# Patient Record
Sex: MALE | Race: BLACK OR AFRICAN AMERICAN | NOT HISPANIC OR LATINO | Employment: FULL TIME | URBAN - METROPOLITAN AREA
[De-identification: names, ages, dates, MRNs, and addresses within clinical notes are randomized per-mention and may not be internally consistent; named-entity substitution may affect disease eponyms.]

---

## 2021-04-22 ENCOUNTER — IMMUNIZATION (OUTPATIENT)
Dept: PRIMARY CARE CLINIC | Facility: CLINIC | Age: 41
End: 2021-04-22

## 2021-04-22 DIAGNOSIS — Z23 NEED FOR VACCINATION: Primary | ICD-10-CM

## 2021-04-22 PROCEDURE — 91300 COVID-19, MRNA, LNP-S, PF, 30 MCG/0.3 ML DOSE VACCINE: CPT | Mod: S$GLB,,, | Performed by: FAMILY MEDICINE

## 2021-04-22 PROCEDURE — 91300 COVID-19, MRNA, LNP-S, PF, 30 MCG/0.3 ML DOSE VACCINE: ICD-10-PCS | Mod: S$GLB,,, | Performed by: FAMILY MEDICINE

## 2021-04-22 PROCEDURE — 0001A COVID-19, MRNA, LNP-S, PF, 30 MCG/0.3 ML DOSE VACCINE: ICD-10-PCS | Mod: CV19,S$GLB,, | Performed by: FAMILY MEDICINE

## 2021-04-22 PROCEDURE — 0001A COVID-19, MRNA, LNP-S, PF, 30 MCG/0.3 ML DOSE VACCINE: CPT | Mod: CV19,S$GLB,, | Performed by: FAMILY MEDICINE

## 2021-05-13 ENCOUNTER — IMMUNIZATION (OUTPATIENT)
Dept: PRIMARY CARE CLINIC | Facility: CLINIC | Age: 41
End: 2021-05-13

## 2021-05-13 DIAGNOSIS — Z23 NEED FOR VACCINATION: Primary | ICD-10-CM

## 2021-05-13 PROCEDURE — 91300 COVID-19, MRNA, LNP-S, PF, 30 MCG/0.3 ML DOSE VACCINE: CPT | Mod: S$GLB,,, | Performed by: FAMILY MEDICINE

## 2021-05-13 PROCEDURE — 91300 COVID-19, MRNA, LNP-S, PF, 30 MCG/0.3 ML DOSE VACCINE: ICD-10-PCS | Mod: S$GLB,,, | Performed by: FAMILY MEDICINE

## 2021-05-13 PROCEDURE — 0002A COVID-19, MRNA, LNP-S, PF, 30 MCG/0.3 ML DOSE VACCINE: CPT | Mod: CV19,S$GLB,, | Performed by: FAMILY MEDICINE

## 2021-05-13 PROCEDURE — 0002A COVID-19, MRNA, LNP-S, PF, 30 MCG/0.3 ML DOSE VACCINE: ICD-10-PCS | Mod: CV19,S$GLB,, | Performed by: FAMILY MEDICINE

## 2021-09-19 ENCOUNTER — OFFICE VISIT (OUTPATIENT)
Dept: URGENT CARE | Facility: CLINIC | Age: 41
End: 2021-09-19
Payer: OTHER GOVERNMENT

## 2021-09-19 VITALS
SYSTOLIC BLOOD PRESSURE: 150 MMHG | HEART RATE: 76 BPM | DIASTOLIC BLOOD PRESSURE: 102 MMHG | OXYGEN SATURATION: 98 % | HEIGHT: 72 IN | WEIGHT: 191 LBS | BODY MASS INDEX: 25.87 KG/M2 | TEMPERATURE: 99 F

## 2021-09-19 DIAGNOSIS — H16.131: Primary | ICD-10-CM

## 2021-09-19 DIAGNOSIS — H16.001 ULCER OF RIGHT CORNEA: ICD-10-CM

## 2021-09-19 PROCEDURE — 99214 OFFICE O/P EST MOD 30 MIN: CPT | Mod: TIER,S$GLB,, | Performed by: NURSE PRACTITIONER

## 2021-09-19 PROCEDURE — 99214 PR OFFICE/OUTPT VISIT, EST, LEVL IV, 30-39 MIN: ICD-10-PCS | Mod: TIER,S$GLB,, | Performed by: NURSE PRACTITIONER

## 2021-09-19 RX ORDER — ERYTHROMYCIN 5 MG/G
OINTMENT OPHTHALMIC 3 TIMES DAILY
Qty: 1 G | Refills: 0 | Status: SHIPPED | OUTPATIENT
Start: 2021-09-19 | End: 2021-09-29

## 2021-09-19 RX ORDER — BALANCED SALT SOLN NON-SURG 6
20 SOLUTION, IRRIGATION OPHTHALMIC (EYE)
Qty: 473 ML | Refills: 1 | Status: SHIPPED | OUTPATIENT
Start: 2021-09-19 | End: 2021-09-22

## 2022-03-10 ENCOUNTER — CLINICAL SUPPORT (OUTPATIENT)
Dept: URGENT CARE | Facility: CLINIC | Age: 42
End: 2022-03-10

## 2022-03-10 DIAGNOSIS — Z02.83 ENCOUNTER FOR EMPLOYMENT-RELATED DRUG TESTING: ICD-10-CM

## 2022-03-10 PROCEDURE — 82075 CHG ASSAY OF BREATH ETHANOL: ICD-10-PCS | Mod: S$GLB,,, | Performed by: EMERGENCY MEDICINE

## 2022-03-10 PROCEDURE — 82075 ASSAY OF BREATH ETHANOL: CPT | Mod: S$GLB,,, | Performed by: EMERGENCY MEDICINE

## 2024-04-23 ENCOUNTER — HOSPITAL ENCOUNTER (EMERGENCY)
Facility: HOSPITAL | Age: 44
Discharge: HOME | End: 2024-04-23
Attending: EMERGENCY MEDICINE
Payer: COMMERCIAL

## 2024-04-23 VITALS
TEMPERATURE: 99 F | SYSTOLIC BLOOD PRESSURE: 161 MMHG | RESPIRATION RATE: 18 BRPM | WEIGHT: 190 LBS | OXYGEN SATURATION: 99 % | DIASTOLIC BLOOD PRESSURE: 89 MMHG | HEART RATE: 67 BPM | HEIGHT: 72 IN | BODY MASS INDEX: 25.73 KG/M2

## 2024-04-23 VITALS
OXYGEN SATURATION: 99 % | SYSTOLIC BLOOD PRESSURE: 152 MMHG | WEIGHT: 190 LBS | DIASTOLIC BLOOD PRESSURE: 103 MMHG | RESPIRATION RATE: 16 BRPM | BODY MASS INDEX: 25.73 KG/M2 | TEMPERATURE: 98 F | HEART RATE: 52 BPM | HEIGHT: 72 IN

## 2024-04-23 DIAGNOSIS — L30.1 DYSHIDROTIC ECZEMA: Primary | ICD-10-CM

## 2024-04-23 DIAGNOSIS — L29.9 PRURITUS: Primary | ICD-10-CM

## 2024-04-23 DIAGNOSIS — L30.9 DERMATITIS: ICD-10-CM

## 2024-04-23 PROCEDURE — 99283 EMERGENCY DEPT VISIT LOW MDM: CPT

## 2024-04-23 PROCEDURE — 63600175 PHARM REV CODE 636 W HCPCS: Performed by: EMERGENCY MEDICINE

## 2024-04-23 PROCEDURE — 25000003 PHARM REV CODE 250: Performed by: EMERGENCY MEDICINE

## 2024-04-23 RX ORDER — PREDNISONE 20 MG/1
40 TABLET ORAL DAILY
Qty: 14 TABLET | Refills: 0 | Status: SHIPPED | OUTPATIENT
Start: 2024-04-23 | End: 2024-04-30

## 2024-04-23 RX ORDER — PERMETHRIN 50 MG/G
CREAM TOPICAL ONCE
Qty: 60 G | Refills: 0 | Status: SHIPPED | OUTPATIENT
Start: 2024-04-23 | End: 2024-04-23

## 2024-04-23 RX ORDER — HYDROXYZINE PAMOATE 25 MG/1
25 CAPSULE ORAL
Status: COMPLETED | OUTPATIENT
Start: 2024-04-23 | End: 2024-04-23

## 2024-04-23 RX ORDER — HYDROXYZINE PAMOATE 25 MG/1
25 CAPSULE ORAL EVERY 6 HOURS PRN
Qty: 28 CAPSULE | Refills: 0 | Status: SHIPPED | OUTPATIENT
Start: 2024-04-23 | End: 2024-04-30

## 2024-04-23 RX ORDER — BETAMETHASONE VALERATE 1 MG/G
CREAM TOPICAL 2 TIMES DAILY
Qty: 45 G | Refills: 0 | Status: SHIPPED | OUTPATIENT
Start: 2024-04-23

## 2024-04-23 RX ORDER — PREDNISONE 20 MG/1
60 TABLET ORAL
Status: COMPLETED | OUTPATIENT
Start: 2024-04-23 | End: 2024-04-23

## 2024-04-23 RX ADMIN — HYDROXYZINE PAMOATE 25 MG: 25 CAPSULE ORAL at 03:04

## 2024-04-23 RX ADMIN — PREDNISONE 60 MG: 20 TABLET ORAL at 10:04

## 2024-04-23 NOTE — Clinical Note
"Bill Harrell" Wayne was seen and treated in our emergency department on 4/23/2024.  He may return to work on 04/29/2024.       If you have any questions or concerns, please don't hesitate to call.      Domingo Pena MD"

## 2024-04-23 NOTE — DISCHARGE INSTRUCTIONS
As you do have rash on the hands we are treating for scabies but this likely is a viral exanthem.  Use as directed and follow-up with dermatologist in 1 week if no relief

## 2024-04-23 NOTE — FIRST PROVIDER EVALUATION
Emergency Department TeleTriage Encounter Note      CHIEF COMPLAINT    Chief Complaint   Patient presents with    Rash     Rash(blisters) to hands and going up arms. Pt was seen here early this morning for same thing and he states it is getting worse.        VITAL SIGNS   Initial Vitals [04/23/24 1641]   BP Pulse Resp Temp SpO2   (!) 161/89 67 18 99 °F (37.2 °C) 99 %      MAP       --            ALLERGIES    Review of patient's allergies indicates:  No Known Allergies    PROVIDER TRIAGE NOTE  Patient presents with complaint of rash to hands.      Phy:   Constitutional: well nourished, well developed, appearing stated age, NAD        Initial orders will be placed and care will be transferred to an alternate provider when patient is roomed for a full evaluation. Any additional orders and the final disposition will be determined by that provider.        ORDERS  Labs Reviewed - No data to display    ED Orders (720h ago, onward)      None              Virtual Visit Note: The provider triage portion of this emergency department evaluation and documentation was performed via [employer], a HIPAA-compliant telemedicine application, in concert with a tele-presenter in the room. A face to face patient evaluation with one of my colleagues will occur once the patient is placed in an emergency department room.      DISCLAIMER: This note was prepared with [employer] voice recognition transcription software. Garbled syntax, mangled pronouns, and other bizarre constructions may be attributed to that software system.

## 2024-04-23 NOTE — ED PROVIDER NOTES
Encounter Date: 4/23/2024       History     Chief Complaint   Patient presents with    Itching     C/o itching with small raised lesions to hands/arms/feet for a couple days.      43-year-old male with papular rash and itching on the hands.  Patient also has diffuse itching all over the body.  Denies fever or chills or nausea vomiting.  Denies any other complaints.  Patient had similar rash in the past.      Review of patient's allergies indicates:  No Known Allergies  No past medical history on file.  No past surgical history on file.  No family history on file.     Review of Systems   Constitutional: Negative.    HENT: Negative.     Eyes: Negative.    Respiratory: Negative.     Cardiovascular: Negative.    Gastrointestinal: Negative.    Endocrine: Negative.    Genitourinary: Negative.    Skin:  Positive for rash.   Allergic/Immunologic: Negative.    Neurological: Negative.    Hematological: Negative.    Psychiatric/Behavioral: Negative.     All other systems reviewed and are negative.      Physical Exam     Initial Vitals [04/23/24 0307]   BP Pulse Resp Temp SpO2   (!) 152/103 (!) 52 16 98.3 °F (36.8 °C) 99 %      MAP       --         Physical Exam    Nursing note and vitals reviewed.  Constitutional: He appears well-developed and well-nourished.   HENT:   Head: Normocephalic and atraumatic.   Nose: Nose normal.   Eyes: Conjunctivae and EOM are normal.   Neck: No tracheal deviation present.   Normal range of motion.  Cardiovascular:  Normal rate, regular rhythm, normal heart sounds and intact distal pulses.     Exam reveals no friction rub.       No murmur heard.  Pulmonary/Chest: Breath sounds normal. No respiratory distress. He has no wheezes. He has no rales.   Abdominal: Abdomen is soft. He exhibits no distension. There is no abdominal tenderness.   Musculoskeletal:         General: Normal range of motion.      Cervical back: Normal range of motion.     Neurological: He is alert and oriented to person, place,  and time. He has normal strength.   Skin: Skin is warm and dry. Capillary refill takes less than 2 seconds.   Papular rash on the hands with itching all over the body and scratch marks all over the body.  No obvious burrows noted however scabies could not be completely ruled out.   Psychiatric: He has a normal mood and affect. Thought content normal.         ED Course   Procedures  Labs Reviewed - No data to display       Imaging Results    None          Medications   hydrOXYzine pamoate capsule 25 mg (has no administration in time range)     Medical Decision Making  Rash on the hands consistent with a viral exanthem and given itching and nature of rash will treat for possible scabies empirically.  Discharged with return precautions and instructions and follow-up with dermatology    Risk  Prescription drug management.                                      Clinical Impression:  Final diagnoses:  [L29.9] Pruritus (Primary)  [L30.9] Dermatitis          ED Disposition Condition    Discharge Stable          ED Prescriptions       Medication Sig Dispense Start Date End Date Auth. Provider    permethrin (ELIMITE) 5 % cream (Expires today) Apply topically once. Apply once on your body and weight 12 hours and take a shower for 1 dose 60 g 4/23/2024 4/23/2024 Tariq Jensen MD    hydrOXYzine pamoate (VISTARIL) 25 MG Cap Take 1 capsule (25 mg total) by mouth every 6 (six) hours as needed. 28 capsule 4/23/2024 4/30/2024 Tariq Jensen MD          Follow-up Information       Follow up With Specialties Details Why Contact Info    [facility]  In 2 days  [address] [place] [id number]  [phone]               Tariq Jensen MD  04/23/24 4082

## 2024-04-24 NOTE — ED PROVIDER NOTES
Encounter Date: 4/23/2024       History     Chief Complaint   Patient presents with    Rash     Rash(blisters) to hands and going up arms. Pt was seen here early this morning for same thing and he states it is getting worse.      Patient presents emergency department reported rash to his hands onset over last few days he has had similar rash many years ago he was seen and prescribed Elimite for suspected scabies states the rash is not improving does not involve any other portions of his body        Review of patient's allergies indicates:  No Known Allergies  No past medical history on file.  No past surgical history on file.  No family history on file.     Review of Systems   Skin:  Positive for rash.   All other systems reviewed and are negative.      Physical Exam     Initial Vitals [04/23/24 1641]   BP Pulse Resp Temp SpO2   (!) 161/89 67 18 99 °F (37.2 °C) 99 %      MAP       --         Physical Exam    Constitutional: He appears well-developed and well-nourished. No distress.   HENT:   Head: Normocephalic and atraumatic.   Eyes: Pupils are equal, round, and reactive to light.   Cardiovascular:  Normal rate, regular rhythm and intact distal pulses.             Neurological: He is alert and oriented to person, place, and time. GCS score is 15. GCS eye subscore is 4. GCS verbal subscore is 5. GCS motor subscore is 6.   Skin: Skin is warm and dry. Capillary refill takes less than 2 seconds. Rash noted. There is erythema.   Rash to hands bilaterally consistent with dyshidrotic eczema   Psychiatric: He has a normal mood and affect. His behavior is normal.         ED Course   Procedures  Labs Reviewed - No data to display       Imaging Results    None          Medications   predniSONE tablet 60 mg (has no administration in time range)     Medical Decision Making  Will will treat with a oral course of steroids and topical high potency steroids outpatient follow-up with Dr. Bhavesh Miller  Prescription drug  management.                                      Clinical Impression:  Final diagnoses:  [L30.1] Dyshidrotic eczema (Primary)          ED Disposition Condition    Discharge Stable          ED Prescriptions       Medication Sig Dispense Start Date End Date Auth. Provider    betamethasone valerate 0.1% (VALISONE) 0.1 % Crea Apply topically 2 (two) times daily. 45 g 4/23/2024 -- Domingo Pena MD    predniSONE (DELTASONE) 20 MG tablet Take 2 tablets (40 mg total) by mouth once daily. for 7 days 14 tablet 4/23/2024 4/30/2024 Domingo Pena MD          Follow-up Information       Follow up With Specialties Details Why Contact Info    Maxime Ospina MD Dermatology Call in 1 day for further evaluation and treatment [address] [facility]  [address]  [facility] [id number]  [phone]               Domingo Pena MD  04/23/24 2224       Domingo Pena MD  04/23/24 2220

## 2024-12-22 ENCOUNTER — HOSPITAL ENCOUNTER (EMERGENCY)
Facility: HOSPITAL | Age: 44
Discharge: HOME | End: 2024-12-22
Attending: EMERGENCY MEDICINE
Payer: COMMERCIAL

## 2024-12-22 VITALS
OXYGEN SATURATION: 100 % | BODY MASS INDEX: 25.73 KG/M2 | RESPIRATION RATE: 18 BRPM | HEART RATE: 62 BPM | SYSTOLIC BLOOD PRESSURE: 156 MMHG | TEMPERATURE: 99 F | WEIGHT: 190 LBS | HEIGHT: 72 IN | DIASTOLIC BLOOD PRESSURE: 104 MMHG

## 2024-12-22 DIAGNOSIS — R19.7 DIARRHEA, UNSPECIFIED TYPE: Primary | ICD-10-CM

## 2024-12-22 PROCEDURE — 99281 EMR DPT VST MAYX REQ PHY/QHP: CPT

## 2024-12-22 NOTE — Clinical Note
"Bill Harrell" Wayne was seen and treated in our emergency department on 12/22/2024.  He may return to work on 12/24/2024.       If you have any questions or concerns, please don't hesitate to call.      Garry Flores MD"

## 2024-12-23 NOTE — DISCHARGE INSTRUCTIONS
Come back to the emergency room if you can not keep food or liquids down, develop palpitations, pass out, or any other concerns.  If you must, you can take Imodium tomorrow, which you can get over-the-counter.  I would start with 1 pill if you need it as more can make you constipated and your diarrhea is resolving. Introduce foods back into your diet slowly over the next few days sticking to bland foods.    Follow up with your PCP.

## 2024-12-23 NOTE — ED PROVIDER NOTES
Encounter Date: 12/22/2024       History     Chief Complaint   Patient presents with    Abdominal Pain     Patient c/o diffuse abdominal pain x 3 days. He states that it is a little better today.    Diarrhea     Patient c/o diarrhea x 3 days     HPI  Bill Black is a 45 yo M with no significant PMH presenting with 3 days of nonbloody diarrhea that it has been improving he has only had 2 episodes today, and was able to eat a meal about an hour ago, and has not had to use the bathroom since.  His primary concerns having uncontrolled diarrhea worked and he works as a .  The only thing he has tried, as his grandma I instructed him to take some flower mixed with water.  But he has not tried any Imodium, or other antidiarrheal medications.  He denies unintended weight palpitations vomiting, blood in his diarrhea, syncope.  No personal contacts with similar symptoms.    Review of patient's allergies indicates:  No Known Allergies  No past medical history on file.  No past surgical history on file.  No family history on file.     Review of Systems   Constitutional:  Negative for fever.   Gastrointestinal:  Positive for diarrhea. Negative for abdominal pain, nausea and vomiting.       Physical Exam     Initial Vitals [12/22/24 1936]   BP Pulse Resp Temp SpO2   (!) 156/104 62 18 98.9 °F (37.2 °C) 100 %      MAP       --         Physical Exam    Nursing note and vitals reviewed.  Constitutional: He appears well-developed and well-nourished.   Sitting on edge of bed.  Well-appearing.   HENT:   Head: Normocephalic and atraumatic.   Eyes: Conjunctivae and EOM are normal.   Neck: Neck supple.   Cardiovascular:  Normal rate and regular rhythm.           Pulmonary/Chest: No respiratory distress.   Abdominal: Abdomen is soft. He exhibits no distension. There is no abdominal tenderness. There is no rebound and no guarding.   Musculoskeletal:         General: No edema.      Cervical back: Neck supple.     Neurological: He  is alert.   Skin: Skin is warm and dry.         ED Course   Procedures  Labs Reviewed - No data to display       Imaging Results    None          Medications - No data to display  Medical Decision Making                   45 yo M with no significant PMH presenting with 3 days of nonbloody diarrhea that it has been improving he has only had 2 episodes today, and was able to eat a meal about an hour ago, and has not had to use the bathroom since.  He denies any vomiting, syncope, hematochezia, hematemesis, history of IBD, recent antibiotics.  Vital signs are stable.  Physical exam reassuring, he is well-appearing, soft nontender abdomen.  Differential includes viral gastroenteritis, low suspicion for IBS, IBD, diarrhea causing clinically significant dehydration or electrolyte abnormalities.    Discharged with recommendation to continue oral rehydration, and if he needs to to take 1 and a diarrhea over-the-counter medication tomorrow while at work.  Strict return precautions given to which patient endorsed understanding.  Given referral to primary care as he is on established    Case discussed with Dr. Kenyon Flores MD  [facility], PGY-3  [facility] ED                   Clinical Impression:  Final diagnoses:  [R19.7] Diarrhea, unspecified type (Primary)          ED Disposition Condition    Discharge Stable          ED Prescriptions    None       Follow-up Information       Follow up With Specialties Details Why Contact Info Additional Information    [facility] - Emergency Dept Emergency Medicine  If symptoms worsen [address]  [facility] [address]  [phone] 1st floor    Dr. Black - Family Medicine Family Medicine   [address]  [address]  [facility] [address]  [phone] [address]             Garry Flores MD  Resident  12/23/24 0019       Garry Flores MD  Resident  12/23/24 9569